# Patient Record
Sex: FEMALE | Race: WHITE | NOT HISPANIC OR LATINO | URBAN - METROPOLITAN AREA
[De-identification: names, ages, dates, MRNs, and addresses within clinical notes are randomized per-mention and may not be internally consistent; named-entity substitution may affect disease eponyms.]

---

## 2017-08-28 PROBLEM — Z00.00 ENCOUNTER FOR PREVENTIVE HEALTH EXAMINATION: Status: ACTIVE | Noted: 2017-08-28

## 2017-09-18 ENCOUNTER — OUTPATIENT (OUTPATIENT)
Dept: OUTPATIENT SERVICES | Facility: HOSPITAL | Age: 27
LOS: 1 days | Discharge: HOME | End: 2017-09-18

## 2017-09-18 ENCOUNTER — APPOINTMENT (OUTPATIENT)
Dept: ANTEPARTUM | Facility: CLINIC | Age: 27
End: 2017-09-18

## 2017-09-18 VITALS — TEMPERATURE: 96.4 F | HEART RATE: 61 BPM | OXYGEN SATURATION: 100 %

## 2017-09-18 VITALS
HEIGHT: 61 IN | WEIGHT: 114.1 LBS | DIASTOLIC BLOOD PRESSURE: 57 MMHG | BODY MASS INDEX: 21.54 KG/M2 | SYSTOLIC BLOOD PRESSURE: 101 MMHG

## 2017-09-18 DIAGNOSIS — Z78.9 OTHER SPECIFIED HEALTH STATUS: ICD-10-CM

## 2017-09-18 DIAGNOSIS — O99.89 OTHER SPECIFIED DISEASES AND CONDITIONS COMPLICATING PREGNANCY, CHILDBIRTH AND THE PUERPERIUM: ICD-10-CM

## 2017-09-18 LAB
BILIRUB UR QL STRIP: NEGATIVE
CLARITY UR: CLEAR
COLLECTION METHOD: NORMAL
GLUCOSE UR-MCNC: NEGATIVE
HCG UR QL: 0.2 EU/DL
HGB UR QL STRIP.AUTO: NEGATIVE
KETONES UR-MCNC: NEGATIVE
LEUKOCYTE ESTERASE UR QL STRIP: NEGATIVE
NITRITE UR QL STRIP: NEGATIVE
PH UR STRIP: 5
PROT UR STRIP-MCNC: NEGATIVE
SP GR UR STRIP: 1.01

## 2017-09-18 RX ORDER — PNV NO.95/FERROUS FUM/FOLIC AC 28MG-0.8MG
TABLET ORAL
Refills: 0 | Status: ACTIVE | COMMUNITY

## 2017-10-17 ENCOUNTER — APPOINTMENT (OUTPATIENT)
Dept: ANTEPARTUM | Facility: CLINIC | Age: 27
End: 2017-10-17

## 2017-10-17 ENCOUNTER — OUTPATIENT (OUTPATIENT)
Dept: OUTPATIENT SERVICES | Facility: HOSPITAL | Age: 27
LOS: 1 days | Discharge: HOME | End: 2017-10-17

## 2017-10-17 DIAGNOSIS — O99.89 OTHER SPECIFIED DISEASES AND CONDITIONS COMPLICATING PREGNANCY, CHILDBIRTH AND THE PUERPERIUM: ICD-10-CM

## 2018-03-14 ENCOUNTER — APPOINTMENT (OUTPATIENT)
Dept: OBGYN | Facility: CLINIC | Age: 28
End: 2018-03-14
Payer: MEDICAID

## 2018-03-14 PROCEDURE — 0503F POSTPARTUM CARE VISIT: CPT

## 2018-07-28 PROBLEM — Z78.9 ALCOHOL USE: Status: INACTIVE | Noted: 2017-09-18

## 2019-06-12 ENCOUNTER — APPOINTMENT (OUTPATIENT)
Dept: OBGYN | Facility: CLINIC | Age: 29
End: 2019-06-12
Payer: MEDICAID

## 2019-06-12 PROCEDURE — 87490 CHLMYD TRACH DNA DIR PROBE: CPT

## 2019-06-12 PROCEDURE — 99214 OFFICE O/P EST MOD 30 MIN: CPT

## 2019-08-21 ENCOUNTER — APPOINTMENT (OUTPATIENT)
Dept: OBGYN | Facility: CLINIC | Age: 29
End: 2019-08-21
Payer: MEDICAID

## 2019-08-21 PROCEDURE — 0502F SUBSEQUENT PRENATAL CARE: CPT

## 2019-09-10 ENCOUNTER — OUTPATIENT (OUTPATIENT)
Dept: OUTPATIENT SERVICES | Facility: HOSPITAL | Age: 29
LOS: 1 days | Discharge: HOME | End: 2019-09-10

## 2019-09-10 ENCOUNTER — APPOINTMENT (OUTPATIENT)
Dept: ANTEPARTUM | Facility: CLINIC | Age: 29
End: 2019-09-10
Payer: MEDICAID

## 2019-09-10 PROCEDURE — 76811 OB US DETAILED SNGL FETUS: CPT | Mod: 26

## 2019-10-30 ENCOUNTER — APPOINTMENT (OUTPATIENT)
Dept: OBGYN | Facility: CLINIC | Age: 29
End: 2019-10-30
Payer: MEDICAID

## 2019-10-30 PROCEDURE — 0502F SUBSEQUENT PRENATAL CARE: CPT

## 2019-11-13 ENCOUNTER — APPOINTMENT (OUTPATIENT)
Dept: OBGYN | Facility: CLINIC | Age: 29
End: 2019-11-13

## 2019-11-18 ENCOUNTER — OUTPATIENT (OUTPATIENT)
Dept: OUTPATIENT SERVICES | Facility: HOSPITAL | Age: 29
LOS: 1 days | Discharge: HOME | End: 2019-11-18

## 2019-11-18 VITALS — HEART RATE: 68 BPM | DIASTOLIC BLOOD PRESSURE: 55 MMHG | SYSTOLIC BLOOD PRESSURE: 103 MMHG

## 2019-11-18 VITALS — TEMPERATURE: 99 F

## 2019-11-18 NOTE — OB PROVIDER TRIAGE NOTE - NSOBPROVIDERNOTE_OBGYN_ALL_OB_FT
27y.o.  @ 34.2wks with  ctx, r/o PTL , h/o PTD x 2  - observation in L&D  - IV hydration  - Continuous efm and  toco  - Dr. Adames/ Dr. Cool aware 27y.o.  @ 34.2wks with  ctx, r/o PTL , h/o PTD x 2  - observation in L&D  - IV hydration  - Continuous efm and  toco  - Dr. Adames/ Dr. Cool aware    Please see addendum

## 2019-11-18 NOTE — OB PROVIDER TRIAGE NOTE - HISTORY OF PRESENT ILLNESS
28Y.O.  @ 34.2wks presents c/o ctx since yesterday which were more intense this AM. Pt states ctx became less frequent on arrival to L&D. Pt denies LOF, VB and states good FM. Pt has h/o PTD G2 @ 33wks and G3 @ 35wks. Pt was not on Progesterone this pregnancy

## 2019-11-18 NOTE — OB PROVIDER TRIAGE NOTE - NSHPPHYSICALEXAM_GEN_ALL_CORE
T(C): 37.2 (11-18-19 @ 10:14), Max: 37.2 (11-18-19 @ 09:57)  HR: 80 (11-18-19 @ 10:16) (80 - 80)  BP: 119/77 (11-18-19 @ 10:16) (119/77 - 119/77)  RR: 16 (11-18-19 @ 10:14) (16 - 16)    VE: L/C/P  Ctx: +irritability  FHR: 145 moderate variability, Cat I

## 2019-11-18 NOTE — PROGRESS NOTE ADULT - SUBJECTIVE AND OBJECTIVE BOX
BARB PGY3 Note:     Patient seen and examined at bedside. Comfortable, denies complaints.      T(C): 37.2 (19 @ 10:14), Max: 37.2 (19 @ 09:57)  HR: 80 (19 @ 10:16) (80 - 80)  BP: 119/77 (19 @ 10:16) (119/77 - 119/77)  RR: 16 (19 @ 10:14) (16 - 16)    EFM: 145-150/mod/accels+  Herman: irreg ctx  SVE: C/L/P    Meds: none     Labs:  none    26yo  at 34w2d GA, not in  labor  - d/c home   -  labor precautions/FKC   - ambulation/PO hydration  - f/u next scheduled appt on     Dr. Cool aware.

## 2019-11-18 NOTE — OB PROVIDER TRIAGE NOTE - NSHPLABSRESULTS_GEN_ALL_CORE
6/18/19  Type and screen A positive, Ab negative  RPR nonreactive  HBSag nonreactive  HIV nonreactive  Rubella immune  Varicella immune    SMA negative  CF screen negative

## 2019-11-18 NOTE — PROGRESS NOTE ADULT - SUBJECTIVE AND OBJECTIVE BOX
pt c/o ctx at 34 weeks.  fhr 140s gbtbv  occassional ctx  sve showed cx;lcp by physician asst  will hydrate and revelauate for cervical change and contractions.

## 2019-11-18 NOTE — OB PROVIDER TRIAGE NOTE - NS_OBGYNHISTORY_OBGYN_ALL_OB_FT
G1 FT  no comp 5'11" and 4'3" no comp  G2 33wks  4lb3oz PTL no comp  G3 35wks  0pb51is no comp was on IM progesterone  G4 40wks  6lb1oz no comp IM progesterone  G5 39wks  5lb2oz no comp- was not on Progesterone, baby with Spina Bifida    Denies STI, PID, abnl PAP, fibroids, cysts

## 2019-11-20 ENCOUNTER — APPOINTMENT (OUTPATIENT)
Dept: OBGYN | Facility: CLINIC | Age: 29
End: 2019-11-20
Payer: MEDICAID

## 2019-11-20 PROBLEM — Z78.9 OTHER SPECIFIED HEALTH STATUS: Chronic | Status: ACTIVE | Noted: 2019-11-18

## 2019-11-20 PROCEDURE — 0502F SUBSEQUENT PRENATAL CARE: CPT

## 2019-12-04 ENCOUNTER — APPOINTMENT (OUTPATIENT)
Dept: OBGYN | Facility: CLINIC | Age: 29
End: 2019-12-04
Payer: MEDICAID

## 2019-12-04 PROCEDURE — 99213 OFFICE O/P EST LOW 20 MIN: CPT

## 2019-12-07 ENCOUNTER — INPATIENT (INPATIENT)
Facility: HOSPITAL | Age: 29
LOS: 1 days | Discharge: HOME | End: 2019-12-09
Attending: OBSTETRICS & GYNECOLOGY | Admitting: OBSTETRICS & GYNECOLOGY
Payer: MEDICAID

## 2019-12-07 VITALS
WEIGHT: 134.04 LBS | SYSTOLIC BLOOD PRESSURE: 105 MMHG | RESPIRATION RATE: 18 BRPM | DIASTOLIC BLOOD PRESSURE: 64 MMHG | HEIGHT: 61 IN | TEMPERATURE: 98 F | HEART RATE: 72 BPM

## 2019-12-07 LAB
BASOPHILS # BLD AUTO: 0.02 K/UL — SIGNIFICANT CHANGE UP (ref 0–0.2)
BASOPHILS NFR BLD AUTO: 0.2 % — SIGNIFICANT CHANGE UP (ref 0–1)
BLD GP AB SCN SERPL QL: SIGNIFICANT CHANGE UP
EOSINOPHIL # BLD AUTO: 0.05 K/UL — SIGNIFICANT CHANGE UP (ref 0–0.7)
EOSINOPHIL NFR BLD AUTO: 0.6 % — SIGNIFICANT CHANGE UP (ref 0–8)
HCT VFR BLD CALC: 39.6 % — SIGNIFICANT CHANGE UP (ref 37–47)
HGB BLD-MCNC: 13.4 G/DL — SIGNIFICANT CHANGE UP (ref 12–16)
IMM GRANULOCYTES NFR BLD AUTO: 0.5 % — HIGH (ref 0.1–0.3)
LYMPHOCYTES # BLD AUTO: 1.97 K/UL — SIGNIFICANT CHANGE UP (ref 1.2–3.4)
LYMPHOCYTES # BLD AUTO: 24.3 % — SIGNIFICANT CHANGE UP (ref 20.5–51.1)
MCHC RBC-ENTMCNC: 33.4 PG — HIGH (ref 27–31)
MCHC RBC-ENTMCNC: 33.8 G/DL — SIGNIFICANT CHANGE UP (ref 32–37)
MCV RBC AUTO: 98.8 FL — SIGNIFICANT CHANGE UP (ref 81–99)
MONOCYTES # BLD AUTO: 0.65 K/UL — HIGH (ref 0.1–0.6)
MONOCYTES NFR BLD AUTO: 8 % — SIGNIFICANT CHANGE UP (ref 1.7–9.3)
NEUTROPHILS # BLD AUTO: 5.37 K/UL — SIGNIFICANT CHANGE UP (ref 1.4–6.5)
NEUTROPHILS NFR BLD AUTO: 66.4 % — SIGNIFICANT CHANGE UP (ref 42.2–75.2)
NRBC # BLD: 0 /100 WBCS — SIGNIFICANT CHANGE UP (ref 0–0)
PLATELET # BLD AUTO: 172 K/UL — SIGNIFICANT CHANGE UP (ref 130–400)
PRENATAL SYPHILIS TEST: SIGNIFICANT CHANGE UP
RBC # BLD: 4.01 M/UL — LOW (ref 4.2–5.4)
RBC # FLD: 13.2 % — SIGNIFICANT CHANGE UP (ref 11.5–14.5)
WBC # BLD: 8.1 K/UL — SIGNIFICANT CHANGE UP (ref 4.8–10.8)
WBC # FLD AUTO: 8.1 K/UL — SIGNIFICANT CHANGE UP (ref 4.8–10.8)

## 2019-12-07 PROCEDURE — 59400 OBSTETRICAL CARE: CPT | Mod: U9

## 2019-12-07 RX ORDER — ONDANSETRON 8 MG/1
4 TABLET, FILM COATED ORAL EVERY 6 HOURS
Refills: 0 | Status: DISCONTINUED | OUTPATIENT
Start: 2019-12-07 | End: 2019-12-09

## 2019-12-07 RX ORDER — BENZOCAINE 10 %
1 GEL (GRAM) MUCOUS MEMBRANE EVERY 6 HOURS
Refills: 0 | Status: DISCONTINUED | OUTPATIENT
Start: 2019-12-07 | End: 2019-12-09

## 2019-12-07 RX ORDER — INFLUENZA VIRUS VACCINE 15; 15; 15; 15 UG/.5ML; UG/.5ML; UG/.5ML; UG/.5ML
0.5 SUSPENSION INTRAMUSCULAR ONCE
Refills: 0 | Status: COMPLETED | OUTPATIENT
Start: 2019-12-07 | End: 2019-12-07

## 2019-12-07 RX ORDER — OXYCODONE HYDROCHLORIDE 5 MG/1
5 TABLET ORAL ONCE
Refills: 0 | Status: DISCONTINUED | OUTPATIENT
Start: 2019-12-07 | End: 2019-12-09

## 2019-12-07 RX ORDER — DEXAMETHASONE 0.5 MG/5ML
4 ELIXIR ORAL EVERY 6 HOURS
Refills: 0 | Status: DISCONTINUED | OUTPATIENT
Start: 2019-12-07 | End: 2019-12-09

## 2019-12-07 RX ORDER — DIPHENHYDRAMINE HCL 50 MG
25 CAPSULE ORAL EVERY 6 HOURS
Refills: 0 | Status: DISCONTINUED | OUTPATIENT
Start: 2019-12-07 | End: 2019-12-09

## 2019-12-07 RX ORDER — ACETAMINOPHEN 500 MG
975 TABLET ORAL
Refills: 0 | Status: DISCONTINUED | OUTPATIENT
Start: 2019-12-07 | End: 2019-12-09

## 2019-12-07 RX ORDER — GLYCERIN ADULT
1 SUPPOSITORY, RECTAL RECTAL AT BEDTIME
Refills: 0 | Status: DISCONTINUED | OUTPATIENT
Start: 2019-12-07 | End: 2019-12-09

## 2019-12-07 RX ORDER — OXYTOCIN 10 UNIT/ML
41.67 VIAL (ML) INJECTION
Qty: 20 | Refills: 0 | Status: DISCONTINUED | OUTPATIENT
Start: 2019-12-07 | End: 2019-12-09

## 2019-12-07 RX ORDER — HYDROCORTISONE 1 %
1 OINTMENT (GRAM) TOPICAL EVERY 6 HOURS
Refills: 0 | Status: DISCONTINUED | OUTPATIENT
Start: 2019-12-07 | End: 2019-12-09

## 2019-12-07 RX ORDER — KETOROLAC TROMETHAMINE 30 MG/ML
30 SYRINGE (ML) INJECTION ONCE
Refills: 0 | Status: DISCONTINUED | OUTPATIENT
Start: 2019-12-07 | End: 2019-12-07

## 2019-12-07 RX ORDER — SODIUM CHLORIDE 9 MG/ML
1000 INJECTION, SOLUTION INTRAVENOUS
Refills: 0 | Status: DISCONTINUED | OUTPATIENT
Start: 2019-12-07 | End: 2019-12-07

## 2019-12-07 RX ORDER — IBUPROFEN 200 MG
600 TABLET ORAL EVERY 6 HOURS
Refills: 0 | Status: DISCONTINUED | OUTPATIENT
Start: 2019-12-07 | End: 2019-12-09

## 2019-12-07 RX ORDER — IBUPROFEN 200 MG
600 TABLET ORAL EVERY 6 HOURS
Refills: 0 | Status: COMPLETED | OUTPATIENT
Start: 2019-12-07 | End: 2020-11-04

## 2019-12-07 RX ORDER — OXYCODONE HYDROCHLORIDE 5 MG/1
5 TABLET ORAL
Refills: 0 | Status: DISCONTINUED | OUTPATIENT
Start: 2019-12-07 | End: 2019-12-09

## 2019-12-07 RX ORDER — MAGNESIUM HYDROXIDE 400 MG/1
30 TABLET, CHEWABLE ORAL
Refills: 0 | Status: DISCONTINUED | OUTPATIENT
Start: 2019-12-07 | End: 2019-12-09

## 2019-12-07 RX ORDER — NALOXONE HYDROCHLORIDE 4 MG/.1ML
0.1 SPRAY NASAL
Refills: 0 | Status: DISCONTINUED | OUTPATIENT
Start: 2019-12-07 | End: 2019-12-09

## 2019-12-07 RX ORDER — SIMETHICONE 80 MG/1
80 TABLET, CHEWABLE ORAL EVERY 4 HOURS
Refills: 0 | Status: DISCONTINUED | OUTPATIENT
Start: 2019-12-07 | End: 2019-12-09

## 2019-12-07 RX ORDER — AER TRAVELER 0.5 G/1
1 SOLUTION RECTAL; TOPICAL EVERY 4 HOURS
Refills: 0 | Status: DISCONTINUED | OUTPATIENT
Start: 2019-12-07 | End: 2019-12-09

## 2019-12-07 RX ORDER — DIBUCAINE 1 %
1 OINTMENT (GRAM) RECTAL EVERY 6 HOURS
Refills: 0 | Status: DISCONTINUED | OUTPATIENT
Start: 2019-12-07 | End: 2019-12-09

## 2019-12-07 RX ORDER — OXYTOCIN 10 UNIT/ML
333.33 VIAL (ML) INJECTION
Qty: 20 | Refills: 0 | Status: DISCONTINUED | OUTPATIENT
Start: 2019-12-07 | End: 2019-12-09

## 2019-12-07 RX ORDER — LANOLIN
1 OINTMENT (GRAM) TOPICAL EVERY 6 HOURS
Refills: 0 | Status: DISCONTINUED | OUTPATIENT
Start: 2019-12-07 | End: 2019-12-09

## 2019-12-07 RX ADMIN — Medication 1000 MILLIUNIT(S)/MIN: at 14:18

## 2019-12-07 RX ADMIN — Medication 125 MILLIUNIT(S)/MIN: at 14:45

## 2019-12-07 RX ADMIN — Medication 30 MILLIGRAM(S): at 15:24

## 2019-12-07 RX ADMIN — SODIUM CHLORIDE 125 MILLILITER(S): 9 INJECTION, SOLUTION INTRAVENOUS at 12:18

## 2019-12-07 RX ADMIN — Medication 30 MILLIGRAM(S): at 14:58

## 2019-12-07 NOTE — OB PROVIDER H&P - NSPREVPRETERM2_OBGYN_ALL_OB
Problem: General Medical Care Plan Goal: *Optimal pain control at patient's stated goal 
Outcome: Progressing Towards Goal 
Discussed patient pain this morning with NP for neurosurgery group. Added totadol for pain management. Will work with patient today to reduce pain. Failed Tocolysis

## 2019-12-07 NOTE — OB PROVIDER H&P - NSHPPHYSICALEXAM_GEN_ALL_CORE
Vital Signs Last 24 Hrs  T(C): 36.6 (07 Dec 2019 12:10), Max: 36.6 (07 Dec 2019 12:10)  T(F): 97.9 (07 Dec 2019 12:10), Max: 97.9 (07 Dec 2019 12:10)  HR: 72 (07 Dec 2019 12:10) (72 - 72)  BP: 105/64 (07 Dec 2019 12:10) (105/64 - 105/64)  RR: 18 (07 Dec 2019 12:10) (18 - 18)    gen: A+OX3. NAD  Abd: soft, palpable contractions  EFM: 150bpm/ mod sera/ accels+  TOCO q2-5 min  SVE 2/50/-3 vertex, ruptured, clear.

## 2019-12-07 NOTE — OB PROVIDER H&P - HISTORY OF PRESENT ILLNESS
27yo  at 37w0d, GBS negative, with complaints of leakage of fluid, clear at 1100 and contractions that started at 1130. Denies VB and reports good FM. Pt has h/o PTD G2 @ 33wks and G3 @ 35wks. Pt was not on Progesterone this pregnancy.

## 2019-12-07 NOTE — OB PROVIDER H&P - ASSESSMENT
29yo  at 37w0d, GBS negative, hx of  labor with spina bifida, in labor.  -Admit to L+D  -Monitor EFM and TOCO   -IVF and labs  -Epidural for pain   -Clear liquid diet as tolerated    Dr. Cool bedside, Dr. Romero aware

## 2019-12-07 NOTE — OB PROVIDER H&P - NS_OBGYNHISTORY_OBGYN_ALL_OB_FT
G1 FT  no comp 5'11" and 4'3" no comp  G2 33wks  4lb3oz PTL no comp  G3 35wks  5wb13lb no comp was on IM progesterone  G4 40wks  6lb1oz no comp IM progesterone  G5 39wks  5lb2oz no comp- was not on Progesterone, baby with Spina Bifida    Denies STI, PID, abnl PAP, fibroids, cysts

## 2019-12-08 LAB
BASOPHILS # BLD AUTO: 0.05 K/UL — SIGNIFICANT CHANGE UP (ref 0–0.2)
BASOPHILS NFR BLD AUTO: 0.4 % — SIGNIFICANT CHANGE UP (ref 0–1)
EOSINOPHIL # BLD AUTO: 0.08 K/UL — SIGNIFICANT CHANGE UP (ref 0–0.7)
EOSINOPHIL NFR BLD AUTO: 0.7 % — SIGNIFICANT CHANGE UP (ref 0–8)
HCT VFR BLD CALC: 33.6 % — LOW (ref 37–47)
HGB BLD-MCNC: 11.4 G/DL — LOW (ref 12–16)
HIV 1+2 AB+HIV1 P24 AG SERPL QL IA: SIGNIFICANT CHANGE UP
IMM GRANULOCYTES NFR BLD AUTO: 0.3 % — SIGNIFICANT CHANGE UP (ref 0.1–0.3)
LYMPHOCYTES # BLD AUTO: 2.83 K/UL — SIGNIFICANT CHANGE UP (ref 1.2–3.4)
LYMPHOCYTES # BLD AUTO: 24.6 % — SIGNIFICANT CHANGE UP (ref 20.5–51.1)
MCHC RBC-ENTMCNC: 33.4 PG — HIGH (ref 27–31)
MCHC RBC-ENTMCNC: 33.9 G/DL — SIGNIFICANT CHANGE UP (ref 32–37)
MCV RBC AUTO: 98.5 FL — SIGNIFICANT CHANGE UP (ref 81–99)
MONOCYTES # BLD AUTO: 1.06 K/UL — HIGH (ref 0.1–0.6)
MONOCYTES NFR BLD AUTO: 9.2 % — SIGNIFICANT CHANGE UP (ref 1.7–9.3)
NEUTROPHILS # BLD AUTO: 7.46 K/UL — HIGH (ref 1.4–6.5)
NEUTROPHILS NFR BLD AUTO: 64.8 % — SIGNIFICANT CHANGE UP (ref 42.2–75.2)
NRBC # BLD: 0 /100 WBCS — SIGNIFICANT CHANGE UP (ref 0–0)
PLATELET # BLD AUTO: 138 K/UL — SIGNIFICANT CHANGE UP (ref 130–400)
RBC # BLD: 3.41 M/UL — LOW (ref 4.2–5.4)
RBC # FLD: 13.2 % — SIGNIFICANT CHANGE UP (ref 11.5–14.5)
WBC # BLD: 11.51 K/UL — HIGH (ref 4.8–10.8)
WBC # FLD AUTO: 11.51 K/UL — HIGH (ref 4.8–10.8)

## 2019-12-08 RX ADMIN — Medication 600 MILLIGRAM(S): at 18:25

## 2019-12-08 RX ADMIN — Medication 600 MILLIGRAM(S): at 00:04

## 2019-12-08 RX ADMIN — Medication 600 MILLIGRAM(S): at 12:03

## 2019-12-08 RX ADMIN — Medication 975 MILLIGRAM(S): at 03:35

## 2019-12-08 RX ADMIN — Medication 975 MILLIGRAM(S): at 20:33

## 2019-12-08 NOTE — PROGRESS NOTE ADULT - ASSESSMENT
A/P:  27 yo P6 s/p , PPD#1, grand multipara, recovering well  -encourage ambulation  -regular diet  -encourage PO hydration  -pain management prn   -f/u AM CBC    Dr. Vasquez and Dr. Cool aware.

## 2019-12-08 NOTE — CHART NOTE - NSCHARTNOTEFT_GEN_A_CORE
FT F LGA infant with maternal history of chorio, hep C, and herpes, admitted to obs for rule out sepsis.     Baby has had 3 D-sticks < 45 in a row. Per NICU PA did not transfer to high risk OVN for intervenous D10 and dextrose gel was not given, continued to do feed and recheck 30 mins after feed and will continue to do preprandial d-sticks. Most recent post prandial 30 min check was 61.

## 2019-12-08 NOTE — PROGRESS NOTE ADULT - SUBJECTIVE AND OBJECTIVE BOX
PGY 1 Post Partum note    Subjective: Patient seen and examined at bedside.  Denies any complaints.  Ambulating, tolerating PO, voiding, + flatus.  Pain well controlled.        Physical exam:    Vital Signs Last 24 Hrs  T(C): 36.1 (08 Dec 2019 07:55), Max: 36.6 (07 Dec 2019 12:10)  T(F): 97 (08 Dec 2019 07:55), Max: 97.9 (07 Dec 2019 12:10)  HR: 52 (08 Dec 2019 07:55) (46 - 74)  BP: 114/66 (08 Dec 2019 07:55) (97/56 - 136/60)  BP(mean): --  RR: 18 (08 Dec 2019 07:55) (18 - 18)  SpO2: 100% (07 Dec 2019 16:48) (86% - 100%)    Gen: NAD  CVS: RRR, no m/r/g  Lungs: CTAB, no w/r/r  Abdomen: nondistended, soft, nontender, firm uterine fundus at the level of the umbilicus  Pelvic: Minimal rubra  Ext: No calf tenderness, no LE swelling      Meds: ([place])   1 Each &lt;See Task&gt; (12-07-19 @ 14:55)    acetaminophen   Tablet ..   975 milliGRAM(s) Oral (12-08-19 @ 03:35)    ibuprofen  Tablet.   600 milliGRAM(s) Oral (12-08-19 @ 00:04)    ketorolac   Injectable   30 milliGRAM(s) IV Push (12-07-19 @ 14:58)    MEDICATIONS  (STANDING):  acetaminophen   Tablet .. 975 milliGRAM(s) Oral <User Schedule>  ibuprofen  Tablet. 600 milliGRAM(s) Oral every 6 hours  influenza   Vaccine 0.5 milliLiter(s) IntraMuscular once    MEDICATIONS  (PRN):  benzocaine 20%/menthol 0.5% Spray 1 Spray(s) Topical every 6 hours PRN for Perineal discomfort  dexAMETHasone  Injectable 4 milliGRAM(s) IV Push every 6 hours PRN Nausea  dibucaine 1% Ointment 1 Application(s) Topical every 6 hours PRN Perineal discomfort  diphenhydrAMINE 25 milliGRAM(s) Oral every 6 hours PRN Pruritus  glycerin Suppository - Adult 1 Suppository(s) Rectal at bedtime PRN Constipation  hydrocortisone 1% Cream 1 Application(s) Topical every 6 hours PRN Moderate Pain (4-6)  lanolin Ointment 1 Application(s) Topical every 6 hours PRN nipple soreness  magnesium hydroxide Suspension 30 milliLiter(s) Oral two times a day PRN Constipation  naloxone Injectable 0.1 milliGRAM(s) IV Push every 3 minutes PRN For ANY of the following changes in patient status:  A. RR LESS THAN 10 breaths per minute, B. Oxygen saturation LESS THAN 90%, C. Sedation score of 6  ondansetron Injectable 4 milliGRAM(s) IV Push every 6 hours PRN Nausea  oxyCODONE    IR 5 milliGRAM(s) Oral every 3 hours PRN Moderate to Severe Pain (4-10)  oxyCODONE    IR 5 milliGRAM(s) Oral once PRN Moderate to Severe Pain (4-10)  simethicone 80 milliGRAM(s) Chew every 4 hours PRN Gas  witch hazel Pads 1 Application(s) Topical every 4 hours PRN Perineal discomfort    Diet: regular    LABS:                        13.4   8.10  )-----------( 172      ( 07 Dec 2019 12:17 )             39.6     A pos  Antibody Screen: NEG (12-07 @ 12:17)  RPR NR  HIV NR    Allergies    penicillins (Hives)

## 2019-12-09 ENCOUNTER — TRANSCRIPTION ENCOUNTER (OUTPATIENT)
Age: 29
End: 2019-12-09

## 2019-12-09 VITALS
DIASTOLIC BLOOD PRESSURE: 62 MMHG | HEART RATE: 52 BPM | RESPIRATION RATE: 19 BRPM | TEMPERATURE: 96 F | SYSTOLIC BLOOD PRESSURE: 128 MMHG

## 2019-12-09 RX ORDER — ACETAMINOPHEN 500 MG
3 TABLET ORAL
Qty: 0 | Refills: 0 | DISCHARGE
Start: 2019-12-09

## 2019-12-09 RX ORDER — IBUPROFEN 200 MG
1 TABLET ORAL
Qty: 0 | Refills: 0 | DISCHARGE
Start: 2019-12-09

## 2019-12-09 RX ADMIN — Medication 600 MILLIGRAM(S): at 00:27

## 2019-12-09 NOTE — DISCHARGE NOTE OB - CARE PROVIDER_API CALL
Christopher Cool)  Obstetrics and Gynecology  [address] [place] [address]  Phone: [phone]  Fax: [phone]  Follow Up Time:

## 2019-12-09 NOTE — DISCHARGE NOTE OB - CARE PLAN
Principal Discharge DX:	Vaginal delivery  Goal:	Healthy mom and baby  Assessment and plan of treatment:	-Nothing in the vagina for 6 weeks. No tampons, no douching, no sex. No swimming, no tub-baths. May shower.  -If fever 100.4F or greater, excessive vaginal bleeding, or severe abdominal pain, call your Ob/Gyn or come to ED or call 911.  -Please see your doctor in 6 weeks for your regular postpartum visit.

## 2019-12-09 NOTE — DISCHARGE NOTE OB - MEDICATION SUMMARY - MEDICATIONS TO TAKE
I will START or STAY ON the medications listed below when I get home from the hospital:    acetaminophen 325 mg oral tablet  -- 3 tab(s) by mouth every 6 hours, As Needed  -- Indication: For pain    ibuprofen 600 mg oral tablet  -- 1 tab(s) by mouth every 6 hours, As Needed  -- Indication: For pain

## 2019-12-09 NOTE — DISCHARGE NOTE OB - PLAN OF CARE
Healthy mom and baby -Nothing in the vagina for 6 weeks. No tampons, no douching, no sex. No swimming, no tub-baths. May shower.  -If fever 100.4F or greater, excessive vaginal bleeding, or severe abdominal pain, call your Ob/Gyn or come to ED or call 911.  -Please see your doctor in 6 weeks for your regular postpartum visit.

## 2019-12-09 NOTE — DISCHARGE NOTE OB - PATIENT PORTAL LINK FT
You can access the FollowMyHealth Patient Portal offered by [facility] by registering at the following website: [email or web address]. By joining [email or web address]’s FollowMyHealth portal, you will also be able to view your health information using other applications (apps) compatible with our system.

## 2019-12-09 NOTE — PROGRESS NOTE ADULT - SUBJECTIVE AND OBJECTIVE BOX
CHIEF COMPLAINT: Postpartum    HPI: Pt doing well, pain well controlled. No overnight events, no acute complaints. Tolerating regular diet, ambulating, voiding. Passed flatus, passed BM. Breastfeeding.     ROS: Denies cardiovascular or respiratory symptoms    PAST MEDICAL & SURGICAL HISTORY:  No pertinent past medical history  No significant past surgical history    PHYSICAL EXAM:  Vital Signs Last 24 Hrs  T(F): 97.1 (08 Dec 2019 23:43), Max: 97.1 (08 Dec 2019 15:47)  HR: 52 (08 Dec 2019 23:43) (52 - 65)  BP: 117/57 (08 Dec 2019 23:43) (114/66 - 117/61)  RR: 18 (08 Dec 2019 23:43) (18 - 20)  General - AAOx3, NAD  Heart - S1S2, RRR  Lungs - CTA BL  Abdomen:  - Soft, nontender, nondistended, BS+  - Fundus firm, nontender, below the umbilicus  Pelvis/Vagina - Normal Lochia  Extremities - No calf tenderness, no swelling bilaterally    LABS:                        11.4   11.51 )-----------( 138      ( 08 Dec 2019 07:41 )             33.6                         13.4   8.10  )-----------( 172      ( 07 Dec 2019 12:17 )             39.6     ASSESSMENT:  29 yo now , s/p , PPD#2, recovering well,     PLAN:  -Routine postpartum care  -Encourage ambulation  -Regular diet  -Pain management prn  -Anticipate d/c home today    Dr. Gomez and Dr. Cool to be made aware.

## 2019-12-11 ENCOUNTER — APPOINTMENT (OUTPATIENT)
Dept: OBGYN | Facility: CLINIC | Age: 29
End: 2019-12-11

## 2019-12-12 DIAGNOSIS — Z87.51 PERSONAL HISTORY OF PRE-TERM LABOR: ICD-10-CM

## 2019-12-12 DIAGNOSIS — Z3A.37 37 WEEKS GESTATION OF PREGNANCY: ICD-10-CM

## 2020-01-08 ENCOUNTER — APPOINTMENT (OUTPATIENT)
Dept: OBGYN | Facility: CLINIC | Age: 30
End: 2020-01-08
Payer: MEDICAID

## 2020-02-03 NOTE — DISCHARGE NOTE OB - REASON FOR REFUSAL
Patient lying in bed informed of the plan of care with understanding. Provided with pillow and call bell within reach. personal

## 2021-02-11 NOTE — DISCHARGE NOTE OB - NS DC ANGIO PCI YN
no Implemented All Universal Safety Interventions:  [place] to call system. Call bell, personal items and telephone within reach. Instruct patient to call for assistance. Room bathroom lighting operational. Non-slip footwear when patient is off stretcher. Physically safe environment: no spills, clutter or unnecessary equipment. Stretcher in lowest position, wheels locked, appropriate side rails in place.

## 2021-04-21 ENCOUNTER — APPOINTMENT (OUTPATIENT)
Dept: OBGYN | Facility: CLINIC | Age: 31
End: 2021-04-21
Payer: MEDICAID

## 2021-04-21 PROCEDURE — 99072 ADDL SUPL MATRL&STAF TM PHE: CPT

## 2021-04-21 PROCEDURE — 99214 OFFICE O/P EST MOD 30 MIN: CPT

## 2021-04-21 PROCEDURE — 87490 CHLMYD TRACH DNA DIR PROBE: CPT

## 2021-06-02 ENCOUNTER — APPOINTMENT (OUTPATIENT)
Dept: ANTEPARTUM | Facility: CLINIC | Age: 31
End: 2021-06-02
Payer: MEDICAID

## 2021-06-02 ENCOUNTER — OUTPATIENT (OUTPATIENT)
Dept: OUTPATIENT SERVICES | Facility: HOSPITAL | Age: 31
LOS: 1 days | Discharge: HOME | End: 2021-06-02

## 2021-06-02 ENCOUNTER — RESULT CHARGE (OUTPATIENT)
Age: 31
End: 2021-06-02

## 2021-06-02 VITALS
WEIGHT: 113 LBS | DIASTOLIC BLOOD PRESSURE: 56 MMHG | OXYGEN SATURATION: 99 % | HEART RATE: 67 BPM | BODY MASS INDEX: 21.35 KG/M2 | TEMPERATURE: 98.4 F | SYSTOLIC BLOOD PRESSURE: 102 MMHG

## 2021-06-02 LAB
BILIRUB UR QL STRIP: NEGATIVE
CLARITY UR: CLEAR
COLLECTION METHOD: NORMAL
FETAL HEART DESCRIPTION: NORMAL
FETAL HEART RATE (BPM): 150
GLUCOSE UR-MCNC: NEGATIVE
HCG UR QL: 0.2 EU/DL
HGB UR QL STRIP.AUTO: NEGATIVE
KETONES UR-MCNC: NEGATIVE
LEUKOCYTE ESTERASE UR QL STRIP: NEGATIVE
NITRITE UR QL STRIP: NEGATIVE
OB COMMENTS: NORMAL
PH UR STRIP: 7.5
PROT UR STRIP-MCNC: NEGATIVE
SCHEDULED VISIT: YES
SP GR UR STRIP: 1
URINE ALBUMIN/PROTEIN: NEGATIVE
URINE GLUCOSE: NEGATIVE
URINE KETONES: NEGATIVE
WEEKS GESTATION: 14.3

## 2021-06-02 PROCEDURE — 99214 OFFICE O/P EST MOD 30 MIN: CPT | Mod: 25

## 2021-06-02 NOTE — OB HISTORY
[PUJA: ___] : PUJA: [unfilled] [EGA: ___ wks] : EGA: [unfilled] wks [Pregnancy History] : Vaginal delivery [___] : at [unfilled] weeks GA [FreeTextEntry1] : delivered at [place]

## 2021-06-02 NOTE — DISCUSSION/SUMMARY
[FreeTextEntry1] : #history of spina bifida\par -Discussed with patient that she is at higher risk of another fetal spina bifida in this pregnancy\par -Patient reports she has been taking 4 mg of folic acid since her last pregnancy. Patient was encouraged to continue today\par -Discussed with patient the utility of genetic testing and ultrasound. \par -Anatomy scan around 20w\par \par \par #History of  delivery\par We discussed the benefits of 17 hydroxyprogesterone caproate injections. There is a 33% reduction in the risk of  delivery in patients who will receive weekly injections. The injections should start between 16 and 24 weeks gestation and should be continued on a weekly basis until 36 weeks gestation. If she misses a week and then the risk of  delivery increases compared to those patients with a history of  delivery but whom did not start 17 hydroxy progesterone caproate \par We also discussed that recent studies in 2019 have shown that there is no significant benefit for 17 hydroxyprogesterone on preventing  delivery\par \par Patient does not desire 17 hydroxyprogesterone in this pregnancy\par \par Serial cervical lengths are also recommended in this population to start between 14 and 16 weeks gestation. This should occur approximately every 2 weeks assuming that the cervical length remains greater than 3 cm and should continue until approximately 24 weeks. Should the cervical length shorten to 2.5 - 3 cm then surveillance should increase to a weekly basis. Should it shorten to < 2.5 cm then her options should be discussed. \par  \par Patient does not desire serial cervical lengths so far but will discuss it with her primary doctor\par \par #pregnancy\par Prenatal care with Dr. Cool\par Apos\par Prenatal labs reviewed and wnl\par \par Precautions given

## 2021-06-02 NOTE — END OF VISIT
[FreeTextEntry3] : [facility] [employer] Staff\par \par Ms. Moreno is a 30 year old  at 14w3d with a history of  delivery.  Her last two pregnancies she delivered at 37 weeks gestation not taking 17 hydroxyprogesterone caproate injections.\par \par We discussed the benefits of 17 hydroxyprogesterone caproate injections.  According to a study published by Ángel et al in the [place] Journal of Medicine in , there is a 33% reduction in the risk of  delivery in patients who will receive weekly injections. The injections should start between 16 and 24 weeks gestation and should be continued on a weekly basis until 36 weeks gestation. If she misses a week and then the risk of  delivery increases compared to those patients with a history of  delivery but whom did not start 17 hydroxy progesterone caproate.\par \par However, on 2019, the PROLONG study was published in the American Journal of Perinatology, and a risk reduction of delivery prior to 35 weeks gestation was not noted when receiving 17 hydroxyprogesterone caproate injections compared to placebo.  There are differences in the populations in each study but these differences are not enough to explain the different results when comparing the two studies.  The current recommendation of the American College of Obstetricians and Gynecologists (2019) is to continue to offer 17 hydroxyprogesterone caproate injections to reduce the risk of  delivery in patients with this history.\par \par Serial cervical lengths are also recommended in this population to start at 16 weeks gestation.  This should occur approximately every 2 weeks assuming that the cervical length remains greater than 3 cm and should continue until approximately 24 weeks. Should the cervical length shorten to 2.5 - 3 cm then surveillance should increase to a weekly basis. Should it shorten to < 2.5 cm then her options should be discussed.\par \par Ms. Moreno declines 17 hydroxyprogesterone caproate injections.  She also declines serial cervical length screening.\par \par We discussed maternal serum AFP to screen for open neural tube defects.  Usually we use ultrasound to screen for anatomical defects if there is an elevated maternal serum  AFP.  Furthermore even if there is a defect noted Ms. Moreno would not terminate the pregnancy.  Therefore I do not recommend screening with maternal serum alpha protein.\par \par Prenatal care is with Dr. Cool.\par \par Follow up anatomy ultrasound in around 6 - 7 weeks.\par \par Follow up at [facility] as needed.\par \par Ms. Moreno expressed understanding and all of her questions were answered to her satisfaction.  Thank you for this consult.\par \par Ian Hall MD\par \par \par \par \par

## 2021-06-02 NOTE — SURGICAL HISTORY
[Fibroids] : no fibroids [Abn Paps] : no abnormal pap smears [Breast Disease] : no breast disease [STI's] : no STI's [Infertility] : no infertility [OC Use] : no OC use [Cysts] : no cysts

## 2021-06-02 NOTE — PHYSICAL EXAM
[FreeTextEntry1] : \par General: In no apparent distress.\par HEENT:  Atraumatic.  Extraocular muscles intact. \par Cardiovascular: Regular rate and rhythm, normal S1/S2\par Pulmonary: Clear to auscultation bilaterally.  No wheezing.\par Abdomen: soft, gravid, nontender, nondistended, no rebound, no guarding\par Extremities: no calf tenderness or edema\par Neurology: +2 reflexes in bilateral upper extremities\par Psychiatry:  Happy mood.  Awake , alert, oriented to person, place, time.\par \par

## 2021-06-02 NOTE — HISTORY OF PRESENT ILLNESS
[FreeTextEntry1] : 30y  at 14w3d dated by 8w sonogram done at PMD's office, referred by Dr. Cool for hx of spina bifida in 2018 and hx of  delivery, multiple pregnancies.  She did not use 17 hydroxyprogesterone injections her last two pregnancies and she delivered at 37 weeks.\par \par Two pregnancies ago the baby had spina bifida at around the S1 region.  At delivery it was noted to be covered by skin.  The baby had surgery and was home in 1 and a half weeks.  He is doing well.  She is currently on folic acid 4 mg PO daily.\par \par Patient reports mild cramping intermittently but denies vaginal bleeding

## 2021-06-03 DIAGNOSIS — Z3A.14 14 WEEKS GESTATION OF PREGNANCY: ICD-10-CM

## 2021-06-03 DIAGNOSIS — O09.892 SUPERVISION OF OTHER HIGH RISK PREGNANCIES, SECOND TRIMESTER: ICD-10-CM

## 2021-06-03 DIAGNOSIS — O35.2XX0 MATERNAL CARE FOR (SUSPECTED) HEREDITARY DISEASE IN FETUS, NOT APPLICABLE OR UNSPECIFIED: ICD-10-CM

## 2021-06-09 ENCOUNTER — APPOINTMENT (OUTPATIENT)
Dept: OBGYN | Facility: CLINIC | Age: 31
End: 2021-06-09
Payer: MEDICAID

## 2021-06-09 PROCEDURE — 0502F SUBSEQUENT PRENATAL CARE: CPT

## 2021-07-22 ENCOUNTER — APPOINTMENT (OUTPATIENT)
Dept: ANTEPARTUM | Facility: CLINIC | Age: 31
End: 2021-07-22
Payer: MEDICAID

## 2021-07-22 ENCOUNTER — OUTPATIENT (OUTPATIENT)
Dept: OUTPATIENT SERVICES | Facility: HOSPITAL | Age: 31
LOS: 1 days | Discharge: HOME | End: 2021-07-22

## 2021-07-22 ENCOUNTER — ASOB RESULT (OUTPATIENT)
Age: 31
End: 2021-07-22

## 2021-07-22 DIAGNOSIS — O09.219 SUPERVISION OF PREGNANCY WITH HISTORY OF PRE-TERM LABOR, UNSPECIFIED TRIMESTER: ICD-10-CM

## 2021-07-22 DIAGNOSIS — Z87.51 PERSONAL HISTORY OF PRE-TERM LABOR: ICD-10-CM

## 2021-07-22 DIAGNOSIS — Z36.3 ENCOUNTER FOR ANTENATAL SCREENING FOR MALFORMATIONS: ICD-10-CM

## 2021-07-22 DIAGNOSIS — Q05.8: ICD-10-CM

## 2021-07-22 DIAGNOSIS — O35.2XX0 MATERNAL CARE FOR (SUSPECTED) HEREDITARY DISEASE IN FETUS, NOT APPLICABLE OR UNSPECIFIED: ICD-10-CM

## 2021-07-22 DIAGNOSIS — Z36.89 ENCOUNTER FOR OTHER SPECIFIED ANTENATAL SCREENING: ICD-10-CM

## 2021-07-22 DIAGNOSIS — Z3A.21 21 WEEKS GESTATION OF PREGNANCY: ICD-10-CM

## 2021-07-22 PROCEDURE — 76811 OB US DETAILED SNGL FETUS: CPT | Mod: 26

## 2021-07-22 PROCEDURE — 76817 TRANSVAGINAL US OBSTETRIC: CPT | Mod: 26

## 2021-09-01 ENCOUNTER — APPOINTMENT (OUTPATIENT)
Dept: OBGYN | Facility: CLINIC | Age: 31
End: 2021-09-01
Payer: MEDICAID

## 2021-09-01 PROCEDURE — 0502F SUBSEQUENT PRENATAL CARE: CPT

## 2021-10-06 ENCOUNTER — APPOINTMENT (OUTPATIENT)
Dept: OBGYN | Facility: CLINIC | Age: 31
End: 2021-10-06
Payer: MEDICAID

## 2021-10-06 PROCEDURE — 0502F SUBSEQUENT PRENATAL CARE: CPT

## 2021-10-20 ENCOUNTER — APPOINTMENT (OUTPATIENT)
Dept: OBGYN | Facility: CLINIC | Age: 31
End: 2021-10-20
Payer: MEDICAID

## 2021-10-20 PROCEDURE — 0502F SUBSEQUENT PRENATAL CARE: CPT

## 2021-11-03 ENCOUNTER — APPOINTMENT (OUTPATIENT)
Dept: OBGYN | Facility: CLINIC | Age: 31
End: 2021-11-03
Payer: MEDICAID

## 2021-11-03 PROCEDURE — 0502F SUBSEQUENT PRENATAL CARE: CPT

## 2021-11-10 ENCOUNTER — APPOINTMENT (OUTPATIENT)
Dept: OBGYN | Facility: CLINIC | Age: 31
End: 2021-11-10
Payer: MEDICAID

## 2021-11-10 PROCEDURE — 0502F SUBSEQUENT PRENATAL CARE: CPT | Mod: 25

## 2021-11-10 PROCEDURE — 76818 FETAL BIOPHYS PROFILE W/NST: CPT

## 2021-11-11 ENCOUNTER — TRANSCRIPTION ENCOUNTER (OUTPATIENT)
Age: 31
End: 2021-11-11

## 2021-11-11 ENCOUNTER — INPATIENT (INPATIENT)
Facility: HOSPITAL | Age: 31
LOS: 0 days | Discharge: HOME | End: 2021-11-12
Attending: OBSTETRICS & GYNECOLOGY | Admitting: OBSTETRICS & GYNECOLOGY
Payer: MEDICAID

## 2021-11-11 VITALS — TEMPERATURE: 98 F

## 2021-11-11 LAB
BASOPHILS # BLD AUTO: 0.03 K/UL — SIGNIFICANT CHANGE UP (ref 0–0.2)
BASOPHILS # BLD AUTO: 0.05 K/UL — SIGNIFICANT CHANGE UP (ref 0–0.2)
BASOPHILS NFR BLD AUTO: 0.2 % — SIGNIFICANT CHANGE UP (ref 0–1)
BASOPHILS NFR BLD AUTO: 0.5 % — SIGNIFICANT CHANGE UP (ref 0–1)
BLD GP AB SCN SERPL QL: SIGNIFICANT CHANGE UP
EOSINOPHIL # BLD AUTO: 0.05 K/UL — SIGNIFICANT CHANGE UP (ref 0–0.7)
EOSINOPHIL # BLD AUTO: 0.06 K/UL — SIGNIFICANT CHANGE UP (ref 0–0.7)
EOSINOPHIL NFR BLD AUTO: 0.4 % — SIGNIFICANT CHANGE UP (ref 0–8)
EOSINOPHIL NFR BLD AUTO: 0.6 % — SIGNIFICANT CHANGE UP (ref 0–8)
HCT VFR BLD CALC: 32.6 % — LOW (ref 37–47)
HCT VFR BLD CALC: 37.2 % — SIGNIFICANT CHANGE UP (ref 37–47)
HGB BLD-MCNC: 11.5 G/DL — LOW (ref 12–16)
HGB BLD-MCNC: 13 G/DL — SIGNIFICANT CHANGE UP (ref 12–16)
HIV 1 & 2 AB SERPL IA.RAPID: SIGNIFICANT CHANGE UP
IMM GRANULOCYTES NFR BLD AUTO: 0.4 % — HIGH (ref 0.1–0.3)
IMM GRANULOCYTES NFR BLD AUTO: 0.5 % — HIGH (ref 0.1–0.3)
LYMPHOCYTES # BLD AUTO: 17.7 % — LOW (ref 20.5–51.1)
LYMPHOCYTES # BLD AUTO: 2.25 K/UL — SIGNIFICANT CHANGE UP (ref 1.2–3.4)
LYMPHOCYTES # BLD AUTO: 3.47 K/UL — HIGH (ref 1.2–3.4)
LYMPHOCYTES # BLD AUTO: 34.5 % — SIGNIFICANT CHANGE UP (ref 20.5–51.1)
MCHC RBC-ENTMCNC: 33.4 PG — HIGH (ref 27–31)
MCHC RBC-ENTMCNC: 33.5 PG — HIGH (ref 27–31)
MCHC RBC-ENTMCNC: 34.9 G/DL — SIGNIFICANT CHANGE UP (ref 32–37)
MCHC RBC-ENTMCNC: 35.3 G/DL — SIGNIFICANT CHANGE UP (ref 32–37)
MCV RBC AUTO: 95 FL — SIGNIFICANT CHANGE UP (ref 81–99)
MCV RBC AUTO: 95.6 FL — SIGNIFICANT CHANGE UP (ref 81–99)
MONOCYTES # BLD AUTO: 1.02 K/UL — HIGH (ref 0.1–0.6)
MONOCYTES # BLD AUTO: 1.16 K/UL — HIGH (ref 0.1–0.6)
MONOCYTES NFR BLD AUTO: 10.1 % — HIGH (ref 1.7–9.3)
MONOCYTES NFR BLD AUTO: 9.1 % — SIGNIFICANT CHANGE UP (ref 1.7–9.3)
NEUTROPHILS # BLD AUTO: 5.42 K/UL — SIGNIFICANT CHANGE UP (ref 1.4–6.5)
NEUTROPHILS # BLD AUTO: 9.19 K/UL — HIGH (ref 1.4–6.5)
NEUTROPHILS NFR BLD AUTO: 53.9 % — SIGNIFICANT CHANGE UP (ref 42.2–75.2)
NEUTROPHILS NFR BLD AUTO: 72.1 % — SIGNIFICANT CHANGE UP (ref 42.2–75.2)
NRBC # BLD: 0 /100 WBCS — SIGNIFICANT CHANGE UP (ref 0–0)
NRBC # BLD: 0 /100 WBCS — SIGNIFICANT CHANGE UP (ref 0–0)
PLATELET # BLD AUTO: 166 K/UL — SIGNIFICANT CHANGE UP (ref 130–400)
PLATELET # BLD AUTO: 186 K/UL — SIGNIFICANT CHANGE UP (ref 130–400)
PRENATAL SYPHILIS TEST: SIGNIFICANT CHANGE UP
RBC # BLD: 3.43 M/UL — LOW (ref 4.2–5.4)
RBC # BLD: 3.89 M/UL — LOW (ref 4.2–5.4)
RBC # FLD: 12.6 % — SIGNIFICANT CHANGE UP (ref 11.5–14.5)
RBC # FLD: 12.6 % — SIGNIFICANT CHANGE UP (ref 11.5–14.5)
SARS-COV-2 RNA SPEC QL NAA+PROBE: SIGNIFICANT CHANGE UP
WBC # BLD: 10.06 K/UL — SIGNIFICANT CHANGE UP (ref 4.8–10.8)
WBC # BLD: 12.74 K/UL — HIGH (ref 4.8–10.8)
WBC # FLD AUTO: 10.06 K/UL — SIGNIFICANT CHANGE UP (ref 4.8–10.8)
WBC # FLD AUTO: 12.74 K/UL — HIGH (ref 4.8–10.8)

## 2021-11-11 PROCEDURE — 59400 OBSTETRICAL CARE: CPT | Mod: U9

## 2021-11-11 RX ORDER — OXYCODONE HYDROCHLORIDE 5 MG/1
5 TABLET ORAL ONCE
Refills: 0 | Status: DISCONTINUED | OUTPATIENT
Start: 2021-11-11 | End: 2021-11-12

## 2021-11-11 RX ORDER — SODIUM CHLORIDE 9 MG/ML
3 INJECTION INTRAMUSCULAR; INTRAVENOUS; SUBCUTANEOUS EVERY 8 HOURS
Refills: 0 | Status: DISCONTINUED | OUTPATIENT
Start: 2021-11-11 | End: 2021-11-12

## 2021-11-11 RX ORDER — DIPHENHYDRAMINE HCL 50 MG
25 CAPSULE ORAL EVERY 6 HOURS
Refills: 0 | Status: DISCONTINUED | OUTPATIENT
Start: 2021-11-11 | End: 2021-11-12

## 2021-11-11 RX ORDER — MAGNESIUM HYDROXIDE 400 MG/1
30 TABLET, CHEWABLE ORAL DAILY
Refills: 0 | Status: DISCONTINUED | OUTPATIENT
Start: 2021-11-11 | End: 2021-11-12

## 2021-11-11 RX ORDER — INFLUENZA VIRUS VACCINE 15; 15; 15; 15 UG/.5ML; UG/.5ML; UG/.5ML; UG/.5ML
0.5 SUSPENSION INTRAMUSCULAR ONCE
Refills: 0 | Status: COMPLETED | OUTPATIENT
Start: 2021-11-11 | End: 2021-11-11

## 2021-11-11 RX ORDER — OXYCODONE HYDROCHLORIDE 5 MG/1
5 TABLET ORAL
Refills: 0 | Status: DISCONTINUED | OUTPATIENT
Start: 2021-11-11 | End: 2021-11-12

## 2021-11-11 RX ORDER — SIMETHICONE 80 MG/1
80 TABLET, CHEWABLE ORAL EVERY 4 HOURS
Refills: 0 | Status: DISCONTINUED | OUTPATIENT
Start: 2021-11-11 | End: 2021-11-12

## 2021-11-11 RX ORDER — OXYTOCIN 10 UNIT/ML
333.33 VIAL (ML) INJECTION
Qty: 20 | Refills: 0 | Status: DISCONTINUED | OUTPATIENT
Start: 2021-11-11 | End: 2021-11-12

## 2021-11-11 RX ORDER — MAGNESIUM HYDROXIDE 400 MG/1
30 TABLET, CHEWABLE ORAL
Refills: 0 | Status: DISCONTINUED | OUTPATIENT
Start: 2021-11-11 | End: 2021-11-11

## 2021-11-11 RX ORDER — KETOROLAC TROMETHAMINE 30 MG/ML
30 SYRINGE (ML) INJECTION ONCE
Refills: 0 | Status: DISCONTINUED | OUTPATIENT
Start: 2021-11-11 | End: 2021-11-11

## 2021-11-11 RX ORDER — SIMETHICONE 80 MG/1
80 TABLET, CHEWABLE ORAL
Refills: 0 | Status: DISCONTINUED | OUTPATIENT
Start: 2021-11-11 | End: 2021-11-12

## 2021-11-11 RX ORDER — LANOLIN
1 OINTMENT (GRAM) TOPICAL EVERY 6 HOURS
Refills: 0 | Status: DISCONTINUED | OUTPATIENT
Start: 2021-11-11 | End: 2021-11-12

## 2021-11-11 RX ORDER — HYDROCORTISONE 1 %
1 OINTMENT (GRAM) TOPICAL EVERY 6 HOURS
Refills: 0 | Status: DISCONTINUED | OUTPATIENT
Start: 2021-11-11 | End: 2021-11-12

## 2021-11-11 RX ORDER — AER TRAVELER 0.5 G/1
1 SOLUTION RECTAL; TOPICAL EVERY 4 HOURS
Refills: 0 | Status: DISCONTINUED | OUTPATIENT
Start: 2021-11-11 | End: 2021-11-12

## 2021-11-11 RX ORDER — BENZOCAINE 10 %
1 GEL (GRAM) MUCOUS MEMBRANE EVERY 6 HOURS
Refills: 0 | Status: DISCONTINUED | OUTPATIENT
Start: 2021-11-11 | End: 2021-11-12

## 2021-11-11 RX ORDER — DIBUCAINE 1 %
1 OINTMENT (GRAM) RECTAL EVERY 6 HOURS
Refills: 0 | Status: DISCONTINUED | OUTPATIENT
Start: 2021-11-11 | End: 2021-11-12

## 2021-11-11 RX ORDER — MAGNESIUM HYDROXIDE 400 MG/1
30 TABLET, CHEWABLE ORAL DAILY
Refills: 0 | Status: DISCONTINUED | OUTPATIENT
Start: 2021-11-11 | End: 2021-11-11

## 2021-11-11 RX ORDER — SENNA PLUS 8.6 MG/1
2 TABLET ORAL AT BEDTIME
Refills: 0 | Status: DISCONTINUED | OUTPATIENT
Start: 2021-11-11 | End: 2021-11-11

## 2021-11-11 RX ORDER — ACETAMINOPHEN 500 MG
975 TABLET ORAL
Refills: 0 | Status: DISCONTINUED | OUTPATIENT
Start: 2021-11-11 | End: 2021-11-12

## 2021-11-11 RX ORDER — IBUPROFEN 200 MG
600 TABLET ORAL EVERY 6 HOURS
Refills: 0 | Status: DISCONTINUED | OUTPATIENT
Start: 2021-11-11 | End: 2021-11-12

## 2021-11-11 RX ORDER — TETANUS TOXOID, REDUCED DIPHTHERIA TOXOID AND ACELLULAR PERTUSSIS VACCINE, ADSORBED 5; 2.5; 8; 8; 2.5 [IU]/.5ML; [IU]/.5ML; UG/.5ML; UG/.5ML; UG/.5ML
0.5 SUSPENSION INTRAMUSCULAR ONCE
Refills: 0 | Status: DISCONTINUED | OUTPATIENT
Start: 2021-11-11 | End: 2021-11-12

## 2021-11-11 RX ORDER — SENNA PLUS 8.6 MG/1
2 TABLET ORAL EVERY 12 HOURS
Refills: 0 | Status: DISCONTINUED | OUTPATIENT
Start: 2021-11-11 | End: 2021-11-12

## 2021-11-11 RX ORDER — IBUPROFEN 200 MG
600 TABLET ORAL EVERY 6 HOURS
Refills: 0 | Status: COMPLETED | OUTPATIENT
Start: 2021-11-11 | End: 2022-10-10

## 2021-11-11 RX ADMIN — Medication 975 MILLIGRAM(S): at 09:30

## 2021-11-11 RX ADMIN — Medication 600 MILLIGRAM(S): at 23:54

## 2021-11-11 RX ADMIN — Medication 1 APPLICATION(S): at 07:24

## 2021-11-11 RX ADMIN — SODIUM CHLORIDE 3 MILLILITER(S): 9 INJECTION INTRAMUSCULAR; INTRAVENOUS; SUBCUTANEOUS at 22:00

## 2021-11-11 RX ADMIN — Medication 30 MILLIGRAM(S): at 03:35

## 2021-11-11 RX ADMIN — Medication 600 MILLIGRAM(S): at 12:00

## 2021-11-11 RX ADMIN — SIMETHICONE 80 MILLIGRAM(S): 80 TABLET, CHEWABLE ORAL at 17:13

## 2021-11-11 RX ADMIN — SODIUM CHLORIDE 3 MILLILITER(S): 9 INJECTION INTRAMUSCULAR; INTRAVENOUS; SUBCUTANEOUS at 06:02

## 2021-11-11 RX ADMIN — SENNA PLUS 2 TABLET(S): 8.6 TABLET ORAL at 06:41

## 2021-11-11 RX ADMIN — Medication 975 MILLIGRAM(S): at 08:31

## 2021-11-11 RX ADMIN — Medication 1 TABLET(S): at 11:03

## 2021-11-11 RX ADMIN — Medication 600 MILLIGRAM(S): at 17:11

## 2021-11-11 RX ADMIN — Medication 100 MILLIGRAM(S): at 04:50

## 2021-11-11 RX ADMIN — Medication 975 MILLIGRAM(S): at 16:30

## 2021-11-11 RX ADMIN — Medication 30 MILLIGRAM(S): at 05:22

## 2021-11-11 RX ADMIN — SODIUM CHLORIDE 3 MILLILITER(S): 9 INJECTION INTRAMUSCULAR; INTRAVENOUS; SUBCUTANEOUS at 13:23

## 2021-11-11 RX ADMIN — SIMETHICONE 80 MILLIGRAM(S): 80 TABLET, CHEWABLE ORAL at 06:40

## 2021-11-11 RX ADMIN — AER TRAVELER 1 APPLICATION(S): 0.5 SOLUTION RECTAL; TOPICAL at 19:59

## 2021-11-11 RX ADMIN — Medication 1 SPRAY(S): at 19:59

## 2021-11-11 RX ADMIN — Medication 0.2 MILLIGRAM(S): at 03:49

## 2021-11-11 RX ADMIN — Medication 975 MILLIGRAM(S): at 15:34

## 2021-11-11 RX ADMIN — Medication 1 APPLICATION(S): at 19:59

## 2021-11-11 RX ADMIN — Medication 600 MILLIGRAM(S): at 11:03

## 2021-11-11 NOTE — OB PROVIDER DELIVERY SUMMARY - NSSELHIDDEN_OBGYN_ALL_OB_FT
[NS_DeliveryAttending1_OBGYN_ALL_OB_FT:[id number]=],[NS_DeliveryRN_OBGYN_ALL_OB_FT:[id number]=]

## 2021-11-11 NOTE — OB RN DELIVERY SUMMARY - NSSELHIDDEN_OBGYN_ALL_OB_FT
[NS_DeliveryAttending1_OBGYN_ALL_OB_FT:[id number]=],[NS_DeliveryRN_OBGYN_ALL_OB_FT:[id number]=]

## 2021-11-11 NOTE — OB PROVIDER H&P - NS_OBGYNHISTORY_OBGYN_ALL_OB_FT
OB Hx: , largest 6-2.  G1 FT  no comp 5'11" and 4'3" no comp  G2 33wks  4lb3oz PTL no comp  G3 35wks  2vp21bg no comp was on IM progesterone  G4 40wks  6lb1oz no comp IM progesterone  G5 39wks  5lb2oz no comp- was not on Progesterone, baby with Spina Bifida  G6 37wks  5bgo3oz no comp    Gyn Hx: Denies STI, PID, abnl PAP, fibroids, cysts

## 2021-11-11 NOTE — OB PROVIDER H&P - HISTORY OF PRESENT ILLNESS
31 yo  @37w4d, PUJA 21 dated by first trimester sono, presents complaining of contractions that started at 0200, are 2 mins apart and 10/10 apart. Patient was checked and fully dilated. Moment later, patient had the need to push and had a precipitous delivery. Patient has a history of  delivery G2 @ 33wks and G3 @ 35wks. History of child with spina bifida. Patient was not on progesterone this pregnancy. GBS neg.

## 2021-11-11 NOTE — OB PROVIDER H&P - ASSESSMENT
A/P: 31 yo  @37w4d, GBS negative, h/o PTLx2, h/o child with spina bifida, s/p precipitous  complicated by third degree laceration  -admit to l&d  -f/u admission labs, HIV  -1600 postpartum labs ordered  -clindamycin 900mg x1, senna standing and milk of mag of third degree laceration  -monitor vitals and bleeding  -routine postpartum care    Dr Malone and Dr Cool aware

## 2021-11-11 NOTE — OB RN DELIVERY SUMMARY - NS_NEWBORNACONDIT_OBGYN_ALL_OB
Anesthesia Post Evaluation    Patient: Radha Spencer    Procedure(s) Performed: Procedure(s) (LRB):  GASTROSTOMY tube insertion (N/A)    Final Anesthesia Type: general  Patient location during evaluation: PACU  Patient participation: Yes- Able to Participate  Level of consciousness: awake and alert and oriented  Post-procedure vital signs: reviewed and stable  Pain management: adequate  Airway patency: patent  PONV status at discharge: No PONV  Anesthetic complications: no      Cardiovascular status: blood pressure returned to baseline  Respiratory status: unassisted  Hydration status: euvolemic  Follow-up not needed.          Vitals Value Taken Time   BP 88/44 2019  2:19 PM   Temp 37.5 °C (99.5 °F) 2019  2:24 PM   Pulse 176 2019  3:22 PM   Resp 48 2019  3:22 PM   SpO2 96 % 2019  3:22 PM   Vitals shown include unvalidated device data.      Event Time     Out of Recovery 2019 13:55:05          Pain/Samina Score: Presence of Pain: non-verbal indicators present (2019  2:25 PM)  Samina Score: 10 (2019  1:30 PM)         Liveborn

## 2021-11-11 NOTE — OB PROVIDER H&P - NSHPPHYSICALEXAM_GEN_ALL_CORE
Physical exam:    Vital Signs Last 24 Hrs  HR: 75 (11 Nov 2021 03:33) (75 - 75)  BP: 128/73 (11 Nov 2021 03:46) (128/54 - 128/73)  RR: 20 (11 Nov 2021 03:33) (20 - 20)    Gen: NAD  Abdomen: soft, gravid, nontender, with palpable contractions    SVE: 10/100/0  FH: 155

## 2021-11-11 NOTE — OB RN PATIENT PROFILE - NS_OBGYNHISTORY_OBGYN_ALL_OB_FT
OB Hx: , largest 6-2.  G1 FT  no comp 5'11" and 4'3" no comp  G2 33wks  4lb3oz PTL no comp  G3 35wks  1nj53ca no comp was on IM progesterone  G4 40wks  6lb1oz no comp IM progesterone  G5 39wks  5lb2oz no comp- was not on Progesterone, baby with Spina Bifida  G6 37wks  4bpu9sh no comp    Gyn Hx: Denies STI, PID, abnl PAP, fibroids, cysts

## 2021-11-12 VITALS
SYSTOLIC BLOOD PRESSURE: 98 MMHG | DIASTOLIC BLOOD PRESSURE: 57 MMHG | RESPIRATION RATE: 18 BRPM | HEART RATE: 64 BPM | TEMPERATURE: 97 F

## 2021-11-12 PROCEDURE — 99238 HOSP IP/OBS DSCHRG MGMT 30/<: CPT

## 2021-11-12 RX ORDER — IBUPROFEN 200 MG
1 TABLET ORAL
Qty: 28 | Refills: 0
Start: 2021-11-12 | End: 2021-11-18

## 2021-11-12 RX ORDER — SENNA PLUS 8.6 MG/1
2 TABLET ORAL
Qty: 120 | Refills: 0
Start: 2021-11-12 | End: 2021-12-11

## 2021-11-12 RX ORDER — ACETAMINOPHEN 500 MG
3 TABLET ORAL
Qty: 48 | Refills: 0
Start: 2021-11-12 | End: 2021-11-15

## 2021-11-12 RX ORDER — SIMETHICONE 80 MG/1
1 TABLET, CHEWABLE ORAL
Qty: 120 | Refills: 0
Start: 2021-11-12 | End: 2021-12-11

## 2021-11-12 RX ADMIN — Medication 975 MILLIGRAM(S): at 08:59

## 2021-11-12 RX ADMIN — Medication 1 TABLET(S): at 11:22

## 2021-11-12 RX ADMIN — Medication 600 MILLIGRAM(S): at 11:23

## 2021-11-12 RX ADMIN — Medication 975 MILLIGRAM(S): at 11:18

## 2021-11-12 RX ADMIN — Medication 600 MILLIGRAM(S): at 00:30

## 2021-11-12 RX ADMIN — Medication 600 MILLIGRAM(S): at 06:10

## 2021-11-12 RX ADMIN — SIMETHICONE 80 MILLIGRAM(S): 80 TABLET, CHEWABLE ORAL at 11:23

## 2021-11-12 RX ADMIN — SENNA PLUS 2 TABLET(S): 8.6 TABLET ORAL at 06:13

## 2021-11-12 NOTE — PROGRESS NOTE ADULT - SUBJECTIVE AND OBJECTIVE BOX
REBECCA VALLECILLO  30y  Female    PGY3 Note:  PPD#1  Pt is recovering well, no acute complaints. Pt states her pain is well controlled. Pt denies fever, chills, nausea, vomiting, SOB, chest pain, extremity swelling or pain. Pt denies headache, dizziness, shortness of breath or syncope.     Ambulating: Yes  Voiding: Yes  Flatus: Yes  Bowel movements: No    Breast or bottle feeding: Both   Diet: Regular    MEDICATIONS  (STANDING):  acetaminophen     Tablet .. 975 milliGRAM(s) Oral <User Schedule>  diphtheria/tetanus/pertussis (acellular) Vaccine (ADAcel) 0.5 milliLiter(s) IntraMuscular once  ibuprofen  Tablet. 600 milliGRAM(s) Oral every 6 hours  magnesium hydroxide Suspension 30 milliLiter(s) Oral daily  oxytocin Infusion 333.333 milliUNIT(s)/Min (1000 mL/Hr) IV Continuous <Continuous>  prenatal multivitamin 1 Tablet(s) Oral daily  senna 2 Tablet(s) Oral every 12 hours  simethicone 80 milliGRAM(s) Chew four times a day  sodium chloride 0.9% lock flush 3 milliLiter(s) IV Push every 8 hours    MEDICATIONS  (PRN):  benzocaine 20%/menthol 0.5% Spray 1 Spray(s) Topical every 6 hours PRN for Perineal discomfort  dibucaine 1% Ointment 1 Application(s) Topical every 6 hours PRN Perineal discomfort  diphenhydrAMINE 25 milliGRAM(s) Oral every 6 hours PRN Pruritus  hydrocortisone 1% Cream 1 Application(s) Topical every 6 hours PRN Moderate Pain (4-6)  lanolin Ointment 1 Application(s) Topical every 6 hours PRN nipple soreness  oxyCODONE    IR 5 milliGRAM(s) Oral every 3 hours PRN Moderate to Severe Pain (4-10)  oxyCODONE    IR 5 milliGRAM(s) Oral once PRN Moderate to Severe Pain (4-10)  simethicone 80 milliGRAM(s) Chew every 4 hours PRN Gas  witch hazel Pads 1 Application(s) Topical every 4 hours PRN Perineal discomfort      PAST MEDICAL & SURGICAL HISTORY:  No pertinent past medical history  No significant past surgical history      Physical Exam  Vital Signs Last 24 Hrs  T(C): 36.8 (11 Nov 2021 23:35), Max: 36.8 (11 Nov 2021 23:35)  T(F): 98.3 (11 Nov 2021 23:35), Max: 98.3 (11 Nov 2021 23:35)  HR: 62 (11 Nov 2021 23:35) (59 - 66)  BP: 101/57 (11 Nov 2021 23:35) (101/57 - 112/55)  RR: 18 (11 Nov 2021 23:35) (18 - 18)    Gen: AAOx3, NAD  Fundus: firm, below umbilicus   Abd: Soft, nontender, nondistended, BS+  : 3rd degree perineal laceration well healing   Lochia: minimal rubra   Ext: No calf tenderness, no swelling    Labs:                        11.5   12.74 )-----------( 166      ( 11 Nov 2021 17:47 )             32.6                         13.0   10.06 )-----------( 186      ( 11 Nov 2021 03:38 )             37.2

## 2021-11-12 NOTE — DISCHARGE NOTE OB - CARE PROVIDER_API CALL
Christopher Cool)  Obstetrics and Gynecology  [address] [place] [address]  Phone: [phone]  Fax: [phone]  Established Patient  Follow Up Time: Routine

## 2021-11-12 NOTE — DISCHARGE NOTE OB - BECAUSE OF A PHYSICAL, MENTAL OR EMOTIONAL CONDITION, DO YOU HAVE DIFFICULTY DOING  ERRANDS ALONE LIKE VISITING A DOCTOR'S OFFICE OR SHOPPING (15 YEARS AND OLDER)
Plastic and Reconstructive Surgery Instructions:    - Leave all surgical dressings in place until your follow-up appointment.     - Do not shower until your follow-up appointment. You may complete sponge baths during that time.    - Continue to wear surgical bra at all times, except with sponge baths.     - Do not place ice on your incisions as this can cause ischemia and necrosis.    - Schedule yourself on Tylenol for additional pain relief for 2-3 days. Do not drink alcohol while you are taking Tylenol. Do not exceed maximum of 4000 mg of Tylenol from all sources in a 24 hour period. Continue with prescribed pain medication as needed for breakthrough pain. You may take a stool softener as needed for constipation.     - Continue to ambulate/walk around at home as much as possible to prevent DVT (deep venous thrombosis)/clot formation.    - Continue with activity restrictions, avoid overhead reaching for now.     - No lifting, pulling, or pushing more than 10 pounds for 6 weeks. Then no lifting, pulling, or pushing more than 20 pounds for an additional 6 weeks. Then no restrictions.    - No lakes, pools, hot tubs for 2 months after surgery.    - Continue to eat high-protein foods (white meat, fish, etc.) to promote healing of your wounds.     - If you have signs and symptoms of infection, including increased pain, swelling, redness, or purulent drainage (pus) from the incisions, fevers or chills.      - Follow up with Raj Richardson at our [facility] office ([address], [address], [place] [address]) on Wednesday 9/9 at 10:30 am.     - Please call Dr. Sevilla's office with any questions or concerns. Clinic number is [phone].      - Call Physician for signs of wound infection:  (1)  Fever greater than 101 degrees F  (2)  Bleeding  (3)  Separation of incision  (4)  Pus like drainage  (5)  Excessive swelling  - For difficulty breathing, vomiting, inability to tolerate oral intake   - For any  pain that is not controlled by pain medication or anything worrisome   -Avoid driving while taking pain medications or experiencing pain   -Contact physician's office for post-operative appointment     Patient/Family given For Your Well-Being Teaching Sheet:     Oral Contraceptive Pills may be ineffective for the next 8 days following anesthesia due to interactions with medications you may have received    Same Day Surgery  Card           __xx____         No

## 2021-11-12 NOTE — DISCHARGE NOTE OB - PATIENT PORTAL LINK FT
You can access the FollowMyHealth Patient Portal offered by [facility] by registering at the following website: [email or web address]. By joining [facility] [employer]’s FollowMyHealth portal, you will also be able to view your health information using other applications (apps) compatible with our system.

## 2021-11-12 NOTE — PROGRESS NOTE ADULT - ATTENDING COMMENTS
Patient seen, case discussed with resident  PPD#1  with 3rd degree perineal laceration, doing well.  Stable for d/c home - counseled on bowel regimen and perineal hygiene to avoid constipation and issues with perineal healing.   F/u 6 weeks for postpartum visit.  Precautions discussed.

## 2021-11-12 NOTE — DISCHARGE NOTE OB - MEDICATION SUMMARY - MEDICATIONS TO TAKE
I will START or STAY ON the medications listed below when I get home from the hospital:    acetaminophen 325 mg oral tablet  -- 3 tab(s) by mouth every 6 hours   -- Indication: For pain    ibuprofen 600 mg oral tablet  -- 1 tab(s) by mouth every 6 hours, As Needed  -- Indication: For pain    Prenatal Multivitamins with Folic Acid 1 mg oral tablet  -- 1 tab(s) by mouth once a day  -- Indication: For postpartum    senna oral tablet  -- 2 tab(s) by mouth every 12 hours  -- Indication: For constipation    simethicone 80 mg oral tablet, chewable  -- 1 tab(s) by mouth 4 times a day  -- Indication: For constipation

## 2021-11-12 NOTE — PROGRESS NOTE ADULT - ASSESSMENT
A/P: 29yo now P7 PPD#1 S/P precipitous  complicated by 3rd degree perineal laceration, recovering well   - encourage ambulation  - encourage PO hydration  - monitor vitals, bleeding  - discussed importance of continuing bowel regimen (simethicone and senna) outpatient   - routine postpartum course  - discussed vaginal precautions   - anticipate d/c home today with follow-up with PMD in 6 weeks     Dr. Aguirre to be made aware A/P: 29yo now P7 PPD#1 S/P precipitous  complicated by 3rd degree perineal laceration, recovering well     - encourage ambulation  - encourage PO hydration  - monitor vitals, bleeding  - discussed importance of continuing bowel regimen (simethicone and senna) outpatient   - routine postpartum course  - discussed vaginal precautions   - anticipate d/c home today with follow-up with PMD in 6 weeks     Dr. Aguirre to be made aware

## 2021-11-12 NOTE — DISCHARGE NOTE OB - HOSPITAL COURSE
precipitous vaginal delivery complicated by a 3rd degree perineal laceration, uncomplicated postpartum course, discharged home in stable condition with bowel regimen

## 2021-11-12 NOTE — DISCHARGE NOTE OB - ADDITIONAL INSTRUCTIONS
If you experience any of the following, please notify your provider:  -fever >100.4F  -increased vaginal bleeding or clotting (saturating a pad an hour)  -foul smelling discharge or bloody discharge from your incision site  -severe abdominal, vaginal, or rectal pain   -persistent headache or vision changes  -swollen areas on your legs that are red, hot, or painful   -swollen, hot, painful areas and/or streaks on your breasts  -cracked or bleeding nipples  -mood swings, depression, or crying spells lasting more than 3 days     Nothing in the vagina for 6 weeks. No intercourse for 6 weeks. No bath tubs, swimming pools, or hot tubs for 6 weeks.     Please schedule an appointment to see your physician in 6 weeks for a postpartum visit

## 2021-11-16 DIAGNOSIS — Z3A.37 37 WEEKS GESTATION OF PREGNANCY: ICD-10-CM

## 2021-12-16 ENCOUNTER — APPOINTMENT (OUTPATIENT)
Dept: OBGYN | Facility: CLINIC | Age: 31
End: 2021-12-16
Payer: MEDICAID

## 2021-12-16 PROCEDURE — 0503F POSTPARTUM CARE VISIT: CPT

## 2021-12-16 PROCEDURE — 87490 CHLMYD TRACH DNA DIR PROBE: CPT

## 2022-04-07 NOTE — OB PROVIDER TRIAGE NOTE - NSDOBORLOSS1_OBGYN_ALL_OB_DT
I know she was interested in reducing the insulin dose or eventually stopping it, recommend increasing glipizide to 10 mg twice a day and reducing lantus to 8 units daily    Call if BG <80 or >350   15-Dec-2010

## 2022-09-20 NOTE — OB PROVIDER H&P - NSDELIVERYTYPE2_OBGYN_ALL_OB
Same Histology In Subsequent Stages Text: The pattern and morphology of the tumor is as described in the first stage. Vaginal

## 2023-04-26 NOTE — DISCHARGE NOTE OB - ADMISSION DATE +STARTOFVISITDATE
Statement Selected - Reports vague chest pain with radiation to her L arm but non-exertional, none currently, no LOC/palpitations  - EKG non-ischemic  - Check trops x2, check TTE  - Consider cards eval in AM  - Monitor on telemetry for now

## 2023-10-29 NOTE — OB PROVIDER TRIAGE NOTE - NS_AFI_OBGYN_ALL_OB_FT
Problem: Pain  Goal: Verbalizes/displays adequate comfort level or baseline comfort level  Outcome: Progressing     Problem: Safety - Adult  Goal: Free from fall injury  Outcome: Progressing     Problem: Safety - Medical Restraint  Goal: Remains free of injury from restraints (Restraint for Interference with Medical Device)  Description: INTERVENTIONS:  1. Determine that other, less restrictive measures have been tried or would not be effective before applying the restraint  2. Evaluate the patient's condition at the time of restraint application  3. Inform patient/family regarding the reason for restraint  4. Q2H: Monitor safety, psychosocial status, comfort, nutrition and hydration  Outcome: Progressing     Problem: Skin/Tissue Integrity  Goal: Absence of new skin breakdown  Description: 1. Monitor for areas of redness and/or skin breakdown  2. Assess vascular access sites hourly  3. Every 4-6 hours minimum:  Change oxygen saturation probe site  4. Every 4-6 hours:  If on nasal continuous positive airway pressure, respiratory therapy assess nares and determine need for appliance change or resting period.   Outcome: Progressing MVP 5.6

## 2025-02-07 NOTE — OB PROVIDER TRIAGE NOTE - NSFIRSTLIVEBIRTH_OBGYN_ALL_OB_DT
Topical Clindamycin Counseling: Patient counseled that this medication may cause skin irritation or allergic reactions.  In the event of skin irritation, the patient was advised to reduce the amount of the drug applied or use it less frequently.   The patient verbalized understanding of the proper use and possible adverse effects of clindamycin.  All of the patient's questions and concerns were addressed. 15-Dec-2010
